# Patient Record
Sex: FEMALE | Race: BLACK OR AFRICAN AMERICAN
[De-identification: names, ages, dates, MRNs, and addresses within clinical notes are randomized per-mention and may not be internally consistent; named-entity substitution may affect disease eponyms.]

---
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
## 2023-08-08 NOTE — PDOC
History of Present Illness





- History of Present Illness


Initial Comments: 





11/09/18 19:47


This patient is a 18 year old female with no significant PMHx, who presents 

with her mother to the ED because the inner skin on her upper lip became stuck 

on her braces this evening. This occurred when she bumped her lower face 

against a door. No other injuries were reported. 








<Leigh Dc - Last Filed: 11/09/18 22:09>





<Rosana Irene - Last Filed: 11/10/18 02:20>





- General


Chief Complaint: Wound


Stated Complaint: DENTAL BRACES   CAUGHT ON UPPER INNER LIP


Time Seen by Provider: 11/09/18 19:17





Past History





<Leigh Dc - Last Filed: 11/09/18 22:09>





- Past Medical History


COPD: No


Other medical history: DENIES





- Suicide/Smoking/Psychosocial Hx


Smoking History: Never smoked


Information on smoking cessation initiated: No


Hx Alcohol Use: No


Drug/Substance Use Hx: No


Substance Use Type: None





<Rosana Irene - Last Filed: 11/10/18 02:20>





- Past Medical History


Allergies/Adverse Reactions: 


 Allergies











Allergy/AdvReac Type Severity Reaction Status Date / Time


 


No Known Allergies Allergy   Unverified 11/09/18 18:37











Home Medications: 


Ambulatory Orders





NK [No Known Home Medication]  11/09/18 











**Review of Systems





- Review of Systems


Comments:: 





GENERAL/CONSTITUTIONAL: No fever, no lethargy


HEAD, EYES, EARS, NOSE AND THROAT: No eye discharge. No ear pain or discharge. 

No sore throat.


CARDIOVASCULAR: No chest pain.


RESPIRATORY: No cough, no wheezing.


GASTROINTESTINAL: No pain, nausea, vomiting, diarrhea or constipation.


GENITOURINARY: No dysuria, no change in urine output


MUSCULOSKELETAL: No joint pain. No neck or back pain.


SKIN: + skin on upper lip stuck to braces. No rash


NEUROLOGIC: No headache, loss of consciousness, irritability.


ENDOCRINE: No increased thirst. No abnormal weight change.


ALLERGIC/IMMUNOLOGIC: No hives or skin allergy.











<Leigh Dc - Last Filed: 11/09/18 22:09>





*Physical Exam





- Vital Signs


 Last Vital Signs











Temp Pulse Resp BP Pulse Ox


 


 98.8 F   77   18   122/79   100 


 


 11/09/18 18:37  11/09/18 18:37  11/09/18 18:37  11/09/18 18:37  11/09/18 18:37














- Physical Exam


Comments: 





GENERAL: Awake, alert, and appropriately interactive


THROAT: Moist mucosa, oropharynx is clear without erythema or exudates. 


SKIN: See head/neck


Head/Neck - Moderate upper lip edema and tenderness with inner portion of mid 

upper lip attached to dental appliance of left central incisor and left lateral 

incisor. No other injury or abnormality evident. 











<Leigh Dc - Last Filed: 11/09/18 22:09>





- Vital Signs


 Last Vital Signs











Temp Pulse Resp BP Pulse Ox


 


 98.8 F   77   18   122/79   100 


 


 11/09/18 18:37  11/09/18 18:37  11/09/18 18:37  11/09/18 18:37  11/09/18 18:37














<Rosana Irene - Last Filed: 11/10/18 02:20>





Medical Decision Making





- Medical Decision Making





Documentation has been prepared under my direction and personally reviewed by 

me in its entirety. I attest that this documented accurately reflects all work, 

treatment, procedures and medical decision making performed by me.





As noted above, this 18-year-old girl presents with her mother with a history 

of having her orthodontic braces of her upper teeth, specifically the braces 

attached to her left central incisor and left lateral incisor embed in the 

mucous membrane of the upper lip, inner surface.  This happened after she 

lightly bumped the lower portion of her face against a door a few hours prior 

to presentation.  No other injury was sustained during this episode.  Patient 

has been unable to unhook braces from the mucous membrane since the injury.  No 

previous history of this type of occurrence.  Patient is otherwise in good 

health and has no significant medical issues.





Physical exam as noted above.


Area around where the appliance was embedded to the mucous membrane was 

preliminarily anesthetized using approximately 1 mL of viscous lidocaine gel 

applied topically.  After this, 1.5 mL of 1% lidocaine was injected into the 

upper lip, just left of the midline where the appliance was embedded into the 

mucous membrane.  When this was performed, the upper lip was able to be gently 

lifted off of the brace.  Underneath, there was a partial-thickness abrasion of 

the mucous membrane of the inner lip corresponding to the left lateral incisor 

area.  This was oozing lightly without significant bleeding; the wound was not 

full-thickness or through and through.  No other significant abrasions or 

lacerations were seen.  With gentle pressure, oozing of the abrasion resolved.





Although a barrier substance such as dental wax would be helpful in this case, 

none could be found in stock here.  Mother was advised to purchase dental wax 

from  pharmacy this evening prior to returning home.  This should be applied to 

the area of the orthodontic braces that had been attached to the inner lip.





Meanwhile, the patient should be on soft diet and rinse mouth after eating.


Followup with orthodontist should be on Monday, Nov 12th





<Rosana Irene - Last Filed: 11/10/18 02:20>





*DC/Admit/Observation/Transfer





- Attestations


Scribe Attestion: 





11/09/18 19:52





Documentation prepared by Leigh Dc, acting as medical scribe for Rosana Irene MD.





<Leigh Dc - Last Filed: 11/09/18 22:09>





<Rosana Irene - Last Filed: 11/10/18 02:20>


Diagnosis at time of Disposition: 


Laceration of intraoral surface of lip


Qualifiers:


 Encounter type: initial encounter Qualified Code(s): S01.511A - Laceration 

without foreign body of lip, initial encounter








- Discharge Dispostion


Disposition: HOME


Condition at time of disposition: Stable





- Patient Instructions


Printed Discharge Instructions:  DI for Mouth Lesions


Additional Instructions: 


Cool compresses to upper lip/keep head elevated tonight


Soft diet; avoid acidic/salty/crunchy foods


Lukewarm water mouthwashes after eating


Use Dental wax or other covering to the brace surface as discussed


Follow-up with orthodontist at next available appointment


Return to ER if lacerated area becomes more painful/swollen Left a message for pt to make an appt with Dr Moralez Gift

## 2023-08-30 ENCOUNTER — APPOINTMENT (OUTPATIENT)
Dept: INTERNAL MEDICINE | Facility: CLINIC | Age: 23
End: 2023-08-30
Payer: COMMERCIAL

## 2023-08-30 VITALS
SYSTOLIC BLOOD PRESSURE: 115 MMHG | HEART RATE: 60 BPM | RESPIRATION RATE: 15 BRPM | TEMPERATURE: 98.4 F | HEIGHT: 61 IN | BODY MASS INDEX: 24.24 KG/M2 | WEIGHT: 128.4 LBS | OXYGEN SATURATION: 95 % | DIASTOLIC BLOOD PRESSURE: 80 MMHG

## 2023-08-30 DIAGNOSIS — N91.2 AMENORRHEA, UNSPECIFIED: ICD-10-CM

## 2023-08-30 DIAGNOSIS — K14.8 OTHER DISEASES OF TONGUE: ICD-10-CM

## 2023-08-30 DIAGNOSIS — Z02.79 ENCOUNTER FOR ISSUE OF OTHER MEDICAL CERTIFICATE: ICD-10-CM

## 2023-08-30 DIAGNOSIS — Z00.00 ENCOUNTER FOR GENERAL ADULT MEDICAL EXAMINATION W/OUT ABNORMAL FINDINGS: ICD-10-CM

## 2023-08-30 PROCEDURE — 81025 URINE PREGNANCY TEST: CPT

## 2023-08-30 PROCEDURE — G0444 DEPRESSION SCREEN ANNUAL: CPT | Mod: 59

## 2023-08-30 PROCEDURE — 99385 PREV VISIT NEW AGE 18-39: CPT | Mod: 25

## 2023-08-30 PROCEDURE — 99214 OFFICE O/P EST MOD 30 MIN: CPT | Mod: 25

## 2023-08-30 PROCEDURE — 93000 ELECTROCARDIOGRAM COMPLETE: CPT | Mod: 59

## 2023-08-30 NOTE — HEALTH RISK ASSESSMENT
[Good] : ~his/her~  mood as  good [No] : No [Monthly or less (1 pt)] : Monthly or less (1 point) [1 or 2 (0 pts)] : 1 or 2 (0 points) [Never (0 pts)] : Never (0 points) [0] : 2) Feeling down, depressed, or hopeless: Not at all (0) [HIV Test offered] : HIV Test offered [Hepatitis C test offered] : Hepatitis C test offered [Former] : Former [5-9] : 5-9 [[id number]] : 0

## 2023-08-30 NOTE — HISTORY OF PRESENT ILLNESS
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 3 due for pap smear has scheduled appt with her Gyn in few weeks  Currently pt feels well, denies fever, headache, chest pain, palpitations, shortness of breath.

## 2023-09-05 ENCOUNTER — NON-APPOINTMENT (OUTPATIENT)
Age: 23
End: 2023-09-05

## 2023-09-06 DIAGNOSIS — Z23 ENCOUNTER FOR IMMUNIZATION: ICD-10-CM

## 2023-09-06 LAB
ALBUMIN SERPL ELPH-MCNC: 4.8 G/DL
ALP BLD-CCNC: 80 U/L
ALT SERPL-CCNC: 10 U/L
ANION GAP SERPL CALC-SCNC: 19 MMOL/L
AST SERPL-CCNC: 15 U/L
BILIRUB SERPL-MCNC: 0.2 MG/DL
BUN SERPL-MCNC: 20 MG/DL
CALCIUM SERPL-MCNC: 10 MG/DL
CHLORIDE SERPL-SCNC: 107 MMOL/L
CHOLEST SERPL-MCNC: 192 MG/DL
CO2 SERPL-SCNC: 15 MMOL/L
CREAT SERPL-MCNC: 1.03 MG/DL
EGFR: 79 ML/MIN/1.73M2
ESTIMATED AVERAGE GLUCOSE: 108 MG/DL
GLUCOSE SERPL-MCNC: 115 MG/DL
HBA1C MFR BLD HPLC: 5.4 %
HBV CORE IGG+IGM SER QL: NONREACTIVE
HBV SURFACE AB SER QL: NONREACTIVE
HBV SURFACE AG SER QL: NONREACTIVE
HCT VFR BLD CALC: 39.3 %
HCV AB SER QL: NONREACTIVE
HCV S/CO RATIO: 0.05 S/CO
HDLC SERPL-MCNC: 62 MG/DL
HGB BLD-MCNC: 13.4 G/DL
HIV1+2 AB SPEC QL IA.RAPID: NONREACTIVE
LDLC SERPL CALC-MCNC: 113 MG/DL
M TB IFN-G BLD-IMP: NEGATIVE
MCHC RBC-ENTMCNC: 26.2 PG
MCHC RBC-ENTMCNC: 34.1 GM/DL
MCV RBC AUTO: 76.8 FL
MEV IGG FLD QL IA: 73.5 AU/ML
MEV IGG+IGM SER-IMP: POSITIVE
MUV AB SER-ACNC: POSITIVE
MUV IGG SER QL IA: 299 AU/ML
NONHDLC SERPL-MCNC: 130 MG/DL
PLATELET # BLD AUTO: 263 K/UL
POTASSIUM SERPL-SCNC: 4.4 MMOL/L
PROT SERPL-MCNC: 7.1 G/DL
QUANTIFERON TB PLUS MITOGEN MINUS NIL: 9.05 IU/ML
QUANTIFERON TB PLUS NIL: 0.02 IU/ML
QUANTIFERON TB PLUS TB1 MINUS NIL: -0.01 IU/ML
QUANTIFERON TB PLUS TB2 MINUS NIL: -0.01 IU/ML
RBC # BLD: 5.12 M/UL
RBC # FLD: 14.5 %
RUBV IGG FLD-ACNC: 7.4 INDEX
RUBV IGG SER-IMP: POSITIVE
SODIUM SERPL-SCNC: 141 MMOL/L
T PALLIDUM AB SER QL IA: NEGATIVE
TRIGL SERPL-MCNC: 93 MG/DL
TSH SERPL-ACNC: 1.43 UIU/ML
VZV AB TITR SER: POSITIVE
VZV IGG SER IF-ACNC: 171 INDEX
WBC # FLD AUTO: 5.56 K/UL

## 2023-09-11 ENCOUNTER — APPOINTMENT (OUTPATIENT)
Dept: INTERNAL MEDICINE | Facility: CLINIC | Age: 23
End: 2023-09-11
Payer: COMMERCIAL

## 2023-09-11 ENCOUNTER — MED ADMIN CHARGE (OUTPATIENT)
Age: 23
End: 2023-09-11

## 2023-09-11 PROCEDURE — 90471 IMMUNIZATION ADMIN: CPT

## 2023-09-11 PROCEDURE — 90746 HEPB VACCINE 3 DOSE ADULT IM: CPT

## 2023-12-05 ENCOUNTER — APPOINTMENT (OUTPATIENT)
Dept: INTERNAL MEDICINE | Facility: CLINIC | Age: 23
End: 2023-12-05
Payer: COMMERCIAL

## 2023-12-05 VITALS
TEMPERATURE: 98.4 F | OXYGEN SATURATION: 98 % | RESPIRATION RATE: 16 BRPM | DIASTOLIC BLOOD PRESSURE: 66 MMHG | HEART RATE: 88 BPM | SYSTOLIC BLOOD PRESSURE: 110 MMHG | WEIGHT: 125 LBS

## 2023-12-05 DIAGNOSIS — G46.3 BRAIN STEM STROKE SYNDROME: ICD-10-CM

## 2023-12-05 DIAGNOSIS — D68.00 VON WILLEBRAND DISEASE, UNSPECIFIED: ICD-10-CM

## 2023-12-05 DIAGNOSIS — N94.6 DYSMENORRHEA, UNSPECIFIED: ICD-10-CM

## 2023-12-05 PROCEDURE — 99214 OFFICE O/P EST MOD 30 MIN: CPT
